# Patient Record
Sex: MALE | Race: WHITE | Employment: FULL TIME | URBAN - METROPOLITAN AREA
[De-identification: names, ages, dates, MRNs, and addresses within clinical notes are randomized per-mention and may not be internally consistent; named-entity substitution may affect disease eponyms.]

---

## 2017-10-30 ENCOUNTER — HOSPITAL ENCOUNTER (INPATIENT)
Facility: HOSPITAL | Age: 57
LOS: 2 days | Discharge: HOME | DRG: 176 | End: 2017-11-01
Attending: INTERNAL MEDICINE | Admitting: INTERNAL MEDICINE
Payer: COMMERCIAL

## 2017-10-30 PROBLEM — I26.99 BILATERAL PULMONARY EMBOLISM (HCC): Status: ACTIVE | Noted: 2017-10-30

## 2017-10-30 RX ORDER — LEVOTHYROXINE SODIUM 0.05 MG/1
50 TABLET ORAL
Status: DISCONTINUED | OUTPATIENT
Start: 2017-10-31 | End: 2017-11-01

## 2017-10-30 RX ORDER — ENOXAPARIN SODIUM 100 MG/ML
1 INJECTION SUBCUTANEOUS EVERY 12 HOURS SCHEDULED
Status: DISCONTINUED | OUTPATIENT
Start: 2017-10-31 | End: 2017-11-01

## 2017-10-30 RX ORDER — METOPROLOL SUCCINATE 100 MG/1
100 TABLET, EXTENDED RELEASE ORAL
Status: DISCONTINUED | OUTPATIENT
Start: 2017-10-30 | End: 2017-11-01

## 2017-10-30 RX ORDER — HYDRALAZINE HYDROCHLORIDE 20 MG/ML
10 INJECTION INTRAMUSCULAR; INTRAVENOUS EVERY 6 HOURS PRN
Status: DISCONTINUED | OUTPATIENT
Start: 2017-10-30 | End: 2017-11-01

## 2017-10-31 ENCOUNTER — APPOINTMENT (OUTPATIENT)
Dept: CV DIAGNOSTICS | Facility: HOSPITAL | Age: 57
DRG: 176 | End: 2017-10-31
Attending: HOSPITALIST
Payer: COMMERCIAL

## 2017-10-31 PROCEDURE — 80048 BASIC METABOLIC PNL TOTAL CA: CPT | Performed by: INTERNAL MEDICINE

## 2017-10-31 PROCEDURE — 93005 ELECTROCARDIOGRAM TRACING: CPT

## 2017-10-31 PROCEDURE — 93306 TTE W/DOPPLER COMPLETE: CPT | Performed by: HOSPITALIST

## 2017-10-31 PROCEDURE — 83735 ASSAY OF MAGNESIUM: CPT | Performed by: INTERNAL MEDICINE

## 2017-10-31 PROCEDURE — 93010 ELECTROCARDIOGRAM REPORT: CPT | Performed by: INTERNAL MEDICINE

## 2017-10-31 PROCEDURE — 85025 COMPLETE CBC W/AUTO DIFF WBC: CPT | Performed by: INTERNAL MEDICINE

## 2017-10-31 RX ORDER — POTASSIUM CHLORIDE 20 MEQ/1
40 TABLET, EXTENDED RELEASE ORAL ONCE
Status: DISCONTINUED | OUTPATIENT
Start: 2017-10-31 | End: 2017-10-31

## 2017-10-31 RX ORDER — POTASSIUM CHLORIDE 20 MEQ/1
40 TABLET, EXTENDED RELEASE ORAL ONCE
Status: COMPLETED | OUTPATIENT
Start: 2017-10-31 | End: 2017-10-31

## 2017-10-31 NOTE — PLAN OF CARE
Patient alert and oriented. NSR on tele. Room air. FERNANDEZ. Therapeutic lovenox. Plan for xarelto tomorrow. ECHO completed. Possible dc samantha.     CARDIOVASCULAR - ADULT    • Maintains optimal cardiac output and hemodynamic stability Progressing        Patient/Fa

## 2017-10-31 NOTE — PLAN OF CARE
Patient alert and oriented times four. RN oriented the patient to the unit and room. Admission navigator completed. Patient on tele. Denies any pain or discomfort. 2L nasal cannula worn.  in place.   Up with standby assist.  Patient resting comforta

## 2017-10-31 NOTE — CONSULTS
[facility] Cardiology Consultation    Hemant Credit Patient Status:  Inpatient    10/25/1960 MRN [id number]   [facility] 7NE-A Attending Collin Larsen MD   Hosp Day # 1 PCP Jaz Davis MD     Reason for Consultation:  DVT/PE      History of Pr °C)  Pulse:  [] 86  Resp:  [18] 18  BP: (131-182)/(70-94) 142/84    Last 3 Weights  10/30/17 2200 : 200 lb 13.4 oz (91.1 kg)  10/30/17 1806 : 202 lb 6.4 oz (91.8 kg)  02/24/17 0824 : 204 lb (92.5 kg)          General: No apparent distress  HEENT: No

## 2017-10-31 NOTE — H&P
[facility] Hospitalist History and Physical      CC: Direct admission for bilateral PE     PCP: Beatriz Cameron MD      History of Present Illness: Patient is a 61year old male with PMH sig for HTN, hypothyroidism, prior RLE DVT and completed course of xarelto who distress, appears stated age. Head:  Normocephalic, without obvious abnormality, atraumatic. Eyes:  Sclera anicteric, No conjunctival pallor, EOMs intact. Nose: Nares normal. Septum midline. Mucosa normal. No drainage.    Throat: Lips, mucosa, and t examination. Pulmonary embolism: There are multiple, large, central pulmonary emboli primarily in the arteries to the lower lobes. Right heart strain: There is some straightening of the interventricular septum suggesting some degree of right heart strain. augment normally. Thrombus is noted distal femoral vein and extends into the popliteal vein and appears to be acute. The peroneal and posterior tibial veins appear to be patent.  IMPRESSION Thrombus within the distal femoral vein and popliteal vein which ap VTE, and no clear provoking factor, may likely need indefinite anticoagulation- defer to hematology  -Troponin WNL    Dyspnea  -2/2 to above  -Currently on RA    HTN  -Continue BB    Hypothyroidism  -Synthroid          Outpatient records or previous hospit

## 2017-10-31 NOTE — PAYOR COMM NOTE
--------------  ADMISSION REVIEW     Payor: [facility]ON [facility]  Subscriber #:  [id number]  Authorization Number: [id number]    Admit date: 10/30/17  Admit time: 2157       Admitting Physician: Ines Rendon MD  Attending Physician:  Ines Rendon MD  Primary Care Physi OBJECTIVE:  /75 (BP Location: Left arm)   Pulse 79   Temp 98 °F (36.7 °C) (Oral)   Resp 18   Ht 6' 2.02\" (1.88 m)   Wt 200 lb 13.4 oz (91.1 kg)   SpO2 93%   BMI 25.78 kg/m²    General:  Alert, no distress, appears stated age.     Head:  Normoceph specific dose reduction were followed while maintaining the necessary diagnostic image quality. FINDINGS   Examination quality: There is good opacification of the pulmonary arteries, yielding a diagnostic quality examination. Pulmonary embolism:  There are deep venous system of the left lower extremity was performed and supplemented by Doppler and duplex interrogation. FINDINGS: The common femoral vein and proximal and mid femoral vein are patent and compress and augment normally.  Thrombus is noted distal fe popliteal scarlet, and right US with chronic nonocclusive thrombus  -Continue enoxaparin  -TTE ordered  -Cardiology consulted due to CT chest report with suggestion of right heart strain  -Hematology consult[NB.1].  Given multiple VTE, and no clear provoking f and  appears to be acute. The peroneal and posterior tibial veins appear to be patent. IMPRESSION  Thrombus within the distal femoral vein and popliteal vein which appears to be acute.      Consults  Date of Service: 10/31/2017 9:58 AM  Farshad Luis Metoprolol Succinate ER  100 mg Oral Daily Beta Blocker         Continuous Infusions:        Social History:   reports that he has never smoked. He has never used smokeless tobacco. He reports that he drinks alcohol. He reports that he does not use drugs. Arlene Eller  10/31/2017  9:58 AM         Electronically signed by Sandy Yu MD at 10/31/2017 10:09 AM

## 2017-11-01 VITALS
HEIGHT: 74.02 IN | DIASTOLIC BLOOD PRESSURE: 78 MMHG | SYSTOLIC BLOOD PRESSURE: 121 MMHG | HEART RATE: 91 BPM | BODY MASS INDEX: 25.77 KG/M2 | WEIGHT: 200.81 LBS | OXYGEN SATURATION: 99 % | RESPIRATION RATE: 17 BRPM | TEMPERATURE: 99 F

## 2017-11-01 PROCEDURE — 84132 ASSAY OF SERUM POTASSIUM: CPT | Performed by: HOSPITALIST

## 2017-11-01 NOTE — PLAN OF CARE
NURSING DISCHARGE NOTE    Discharged Home via Ambulatory. Accompanied by Support staff  Belongings Taken by patient/family. pt verbalized understanding of discharge instructions, prescription and med info given, pt discharged home with a friend.

## 2017-11-01 NOTE — PLAN OF CARE
CARDIOVASCULAR - ADULT    • Maintains optimal cardiac output and hemodynamic stability Adequate for Discharge        Patient/Family Goals    • Patient/Family Long Term Goal Adequate for Discharge    • Patient/Family Short Term Goal Adequate for Discharge

## 2017-11-01 NOTE — CM/SW NOTE
RN requesting that CM obtain script for patient for Rivaroxaban. Script called in to patient's pharmacy.  Cost to patient $150.90

## 2017-11-01 NOTE — PLAN OF CARE
AOx4, RA, SR via tele  Patient reports no pain or SOB  Plan for xarelto and possible discharge tomorrow  VSS, patient resting comfortably  Will continue to monitor

## 2017-11-01 NOTE — PROGRESS NOTES
[facility] Cardiology Progress Note        Anastacio Mosquera Patient Status:  Inpatient    10/25/1960 MRN [id number]   [facility] 7NE-A Attending Baltazar Officer, MD   Hosp Day # 2 PCP Virgil Belle MD     Subjective:  No i 10/30/17   1821   INR  1.0                 Impression:  1. Acute DVT/PE - CTA images reviewed, and a fair amount of clot burden present. 2. Prior DVT RLE 7/17 for which he took 6 months of Xarelto. 3. HTN  4. Borderline enlargement of ascending Ao.   5. E

## 2017-11-01 NOTE — CONSULTS
[facility]  Report of Consultation    Hemant Credit Patient Status:  Inpatient    10/25/1960 MRN [id number]   [facility] 7NE-A Attending Valerie Lara MD   Hosp Day # 2 PCP Jaz Davis MD     REASON FOR CONSULTATION:     Venous quad (FLULAVAL) ages 7 months to 72 years inj 0.5ml, 0.5 mL, Intramuscular, Prior to discharge  •  hydrALAzine HCl (APRESOLINE) injection 10 mg, 10 mg, Intravenous, Q6H PRN    Review of Systems:  A 10-point review of systems was done with pertinent positiv were followed while maintaining the necessary diagnostic image quality. FINDINGS   Examination quality: There is good opacification of the pulmonary arteries, yielding a diagnostic quality examination. Pulmonary embolism:  There are multiple, large, central the left lower extremity was performed and supplemented by Doppler and duplex interrogation. FINDINGS: The common femoral vein and proximal and mid femoral vein are patent and compress and augment normally.  Thrombus is noted distal femoral vein and extends then follow up in clinic  Re thrombophilia, we will discuss the work up in clinic      Thank you for allowing me to participate in the care of your patient.     Fiordaliza Garcia MD

## 2017-11-01 NOTE — PLAN OF CARE
CARDIOVASCULAR - ADULT    • Maintains optimal cardiac output and hemodynamic stability Progressing        Patient/Family Goals    • Patient/Family Long Term Goal Progressing    • Patient/Family Short Term Goal Progressing            Assumed care at 2330.  P

## 2017-11-01 NOTE — PAYOR COMM NOTE
--------------  CONTINUED STAY REVIEW    Angelamichael Romerodestinee [facility]  Subscriber #:  [id number]  Authorization Number: [id number]    Admit date: 10/30/17  Admit time: 2157    Admitting Physician: Alissa Sheikh MD  Attending Physician:  Alexei Holcomb MD  Primary Care P never smoked. He has never used smokeless tobacco. He reports that he drinks alcohol.  He reports that he does not use drugs.     Allergies:     Shellfish-Derived P*    Rash     Medications:     Current Facility-Administered Medications:   •  enoxaparin sod 10/30/17   1821  10/31/17   0612   RBC  4.94  4.91   HGB  15.3  14.9   HCT  42.9  42.9   MCV  86.8  87.4   MCH  31.0  30.3   MCHC  35.7  34.7   RDW  12.5  12.1   NEPRELIM   --   3.68   WBC  8.35  5.7   PLT  197  194.0                  Imaging:     Ct Angio Multiple, large, central pulmonary emboli seen in the arteries to the bilateral lower lobes. There is some straightening of the interventricular septum suggesting a component of right heart strain.  2. 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        PROBLEM LIST:      Patient Active Problem List:     Dilatation of aorta (HCC)     Essential hypertension, benign     Hypothyroid     Bilateral pulmonary embolism ([address]

## 2017-11-08 NOTE — PAYOR COMM NOTE
--------------  DISCHARGE REVIEW    Payor: [employer]ON [employer]  Subscriber #:  [id number]  Authorization Number: [id number]    Admit date: 10/30/17  Admit time:  2157  Discharge Date: 11/1/2017  2:16 PM     Admitting Physician: Carmen Nelson MD  Attending Physician:  Gallo Wilson Shellfish-Derived P*    Rash     Medications:  • enoxaparin  1 mg/kg Subcutaneous 2 times per day   • Levothyroxine Sodium  50 mcg Oral Before breakfast   • Metoprolol Succinate ER  100 mg Oral Daily Beta Blocker         Continuous Infusions:     Social Hi 4. Borderline enlargement of ascending Ao.     Plan: Will review echo. Life long a/c. Currently on Lovenox. Begin Xarelto tomorrow.   Consider heme eval.     Alfonos Alcantara  10/31/2017  9:58 AM         Electronically signed by Lazara Sarmiento

## 2017-11-10 PROBLEM — I82.409 RECURRENT DEEP VEIN THROMBOSIS (DVT) (HCC): Status: ACTIVE | Noted: 2017-11-10

## 2017-11-13 NOTE — DISCHARGE SUMMARY
General Medicine Discharge Summary     Patient ID:  Rex Tovar  61year old  10/25/1960    Admit date: 10/30/2017    Discharge date and time: 11/1/2017  2:16 PM     Attending Physician: Stephanie Morrissey prior RLE DVT and completed course of xarelto who presented to [facility] for dyspnea and was found to have bilateral PE and acute LLE DVT and admitted to [facility] for further management.  Patient states he has been having SOB for 6 days, worse with exertio printed, Disp-51 each, R-0      CONTINUE these medications which have NOT CHANGED    Levothyroxine Sodium 50 MCG Oral Tab  Take 1 tablet (50 mcg total) by mouth once daily. , Normal, Disp-90 tablet, R-3    Metoprolol Succinate ER (TOPROL XL) 100 MG Oral Tab

## 2018-02-09 PROCEDURE — 85732 THROMBOPLASTIN TIME PARTIAL: CPT | Performed by: INTERNAL MEDICINE

## 2018-02-09 PROCEDURE — 85305 CLOT INHIBIT PROT S TOTAL: CPT | Performed by: INTERNAL MEDICINE

## 2018-02-09 PROCEDURE — 86146 BETA-2 GLYCOPROTEIN ANTIBODY: CPT | Performed by: INTERNAL MEDICINE

## 2018-02-09 PROCEDURE — 81291 MTHFR GENE: CPT | Performed by: INTERNAL MEDICINE

## 2018-02-09 PROCEDURE — 85705 THROMBOPLASTIN INHIBITION: CPT | Performed by: INTERNAL MEDICINE

## 2018-02-09 PROCEDURE — 81240 F2 GENE: CPT | Performed by: INTERNAL MEDICINE

## 2018-02-09 PROCEDURE — 85610 PROTHROMBIN TIME: CPT | Performed by: INTERNAL MEDICINE

## 2018-02-09 PROCEDURE — 81241 F5 GENE: CPT | Performed by: INTERNAL MEDICINE

## 2018-02-09 PROCEDURE — 85300 ANTITHROMBIN III ACTIVITY: CPT | Performed by: INTERNAL MEDICINE

## 2018-02-09 PROCEDURE — 86147 CARDIOLIPIN ANTIBODY EA IG: CPT | Performed by: INTERNAL MEDICINE

## 2018-02-09 PROCEDURE — 85303 CLOT INHIBIT PROT C ACTIVITY: CPT | Performed by: INTERNAL MEDICINE

## 2018-02-09 PROCEDURE — 85613 RUSSELL VIPER VENOM DILUTED: CPT | Performed by: INTERNAL MEDICINE

## 2018-02-09 PROCEDURE — 36415 COLL VENOUS BLD VENIPUNCTURE: CPT | Performed by: INTERNAL MEDICINE

## 2018-02-09 PROCEDURE — 85306 CLOT INHIBIT PROT S FREE: CPT | Performed by: INTERNAL MEDICINE

## 2018-05-04 PROBLEM — Z79.01 LONG TERM (CURRENT) USE OF ANTICOAGULANTS: Status: ACTIVE | Noted: 2018-05-04

## 2019-02-18 PROCEDURE — 88305 TISSUE EXAM BY PATHOLOGIST: CPT | Performed by: INTERNAL MEDICINE
